# Patient Record
Sex: FEMALE | Race: WHITE | NOT HISPANIC OR LATINO
[De-identification: names, ages, dates, MRNs, and addresses within clinical notes are randomized per-mention and may not be internally consistent; named-entity substitution may affect disease eponyms.]

---

## 2017-10-26 ENCOUNTER — CHARTING TRANS (OUTPATIENT)
Dept: OTHER | Age: 62
End: 2017-10-26

## 2018-01-16 ENCOUNTER — CHARTING TRANS (OUTPATIENT)
Dept: OTHER | Age: 63
End: 2018-01-16

## 2018-01-29 ENCOUNTER — CHARTING TRANS (OUTPATIENT)
Dept: OTHER | Age: 63
End: 2018-01-29

## 2018-04-24 ENCOUNTER — LAB SERVICES (OUTPATIENT)
Dept: OTHER | Age: 63
End: 2018-04-24

## 2018-05-01 ENCOUNTER — CHARTING TRANS (OUTPATIENT)
Dept: OTHER | Age: 63
End: 2018-05-01

## 2018-05-01 LAB
CALCIUM IONIZED: 1.31 MMOL/L (ref 1.15–1.29)
CALCIUM URINE: 5.4 MG/DL
CREATININE 24 HOUR URINE: 1176 MG/24 (ref 800–1800)
CREATININE URINE: 44.8 MG/DL
CREATININE: 0.85 MG/DL (ref 0.52–1.04)
EGFR FOR AFRICAN AMERICANS: > 60
EGFR FOR NON-AFRICAN AMERICANS: > 60
IONIZED CALCIUM CORRECTION: 1.27 MMOL/L (ref 1.15–1.29)
T3 FREE: 2.4 PG/ML (ref 2.2–4)
THYROGLOBULIN ANTIBODY: <0.9 IU/ML (ref 0–4)
THYROXINE, FREE: 0.87 NG/DL (ref 0.78–2.19)
TOTAL T3: 0.92 NG/ML (ref 0.6–1.81)
U-CALCIUM: 142 MG/24 HR (ref 50–300)
URINE VOLUME: 2625 ML

## 2018-11-02 VITALS
BODY MASS INDEX: 39.38 KG/M2 | WEIGHT: 222.22 LBS | SYSTOLIC BLOOD PRESSURE: 126 MMHG | DIASTOLIC BLOOD PRESSURE: 68 MMHG | HEART RATE: 53 BPM | HEIGHT: 63 IN | OXYGEN SATURATION: 96 %

## 2018-11-02 VITALS
SYSTOLIC BLOOD PRESSURE: 151 MMHG | HEART RATE: 49 BPM | WEIGHT: 223 LBS | DIASTOLIC BLOOD PRESSURE: 83 MMHG | BODY MASS INDEX: 39.51 KG/M2 | HEIGHT: 63 IN

## 2019-02-06 ENCOUNTER — TELEPHONE (OUTPATIENT)
Dept: ENDOCRINOLOGY | Age: 64
End: 2019-02-06

## 2019-02-06 DIAGNOSIS — E04.2 MULTINODULAR NON-TOXIC GOITER: Primary | ICD-10-CM

## 2019-02-06 DIAGNOSIS — E83.52 HYPERCALCEMIA: ICD-10-CM

## 2019-02-06 DIAGNOSIS — E04.2 NONTOXIC MULTINODULAR GOITER: ICD-10-CM

## 2019-02-06 DIAGNOSIS — E55.9 VITAMIN D DEFICIENCY: ICD-10-CM

## 2019-03-28 ENCOUNTER — OFFICE VISIT (OUTPATIENT)
Dept: ENDOCRINOLOGY | Age: 64
End: 2019-03-28

## 2019-03-28 VITALS
BODY MASS INDEX: 40.04 KG/M2 | HEART RATE: 57 BPM | HEIGHT: 63 IN | SYSTOLIC BLOOD PRESSURE: 143 MMHG | WEIGHT: 226 LBS | DIASTOLIC BLOOD PRESSURE: 89 MMHG

## 2019-03-28 DIAGNOSIS — E55.9 VITAMIN D DEFICIENCY: ICD-10-CM

## 2019-03-28 DIAGNOSIS — E21.3 HYPERPARATHYROIDISM (CMD): ICD-10-CM

## 2019-03-28 DIAGNOSIS — E83.52 HYPERCALCEMIA: Primary | ICD-10-CM

## 2019-03-28 DIAGNOSIS — E04.2 NONTOXIC MULTINODULAR GOITER: ICD-10-CM

## 2019-03-28 PROBLEM — E66.01 MORBID OBESITY (CMD): Status: ACTIVE | Noted: 2019-03-28

## 2019-03-28 PROCEDURE — 99214 OFFICE O/P EST MOD 30 MIN: CPT | Performed by: INTERNAL MEDICINE

## 2019-03-28 RX ORDER — IBUPROFEN 200 MG
200 TABLET ORAL PRN
COMMUNITY

## 2019-03-28 RX ORDER — ESCITALOPRAM OXALATE 10 MG/1
20 TABLET ORAL DAILY
COMMUNITY

## 2019-03-28 RX ORDER — AMOXICILLIN 500 MG
CAPSULE ORAL DAILY
COMMUNITY

## 2019-03-28 RX ORDER — MULTIVIT,IRON,MINERALS/LUTEIN
TABLET ORAL DAILY
COMMUNITY

## 2019-03-28 RX ORDER — MELOXICAM 15 MG/1
TABLET ORAL DAILY
COMMUNITY
End: 2019-11-18 | Stop reason: ALTCHOICE

## 2019-04-03 DIAGNOSIS — E55.9 VITAMIN D DEFICIENCY: ICD-10-CM

## 2019-04-03 DIAGNOSIS — E04.2 NONTOXIC MULTINODULAR GOITER: ICD-10-CM

## 2019-04-03 DIAGNOSIS — E04.2 MULTINODULAR NON-TOXIC GOITER: ICD-10-CM

## 2019-04-03 DIAGNOSIS — E83.52 HYPERCALCEMIA: ICD-10-CM

## 2019-05-14 LAB
ALBUMIN: 4.2 GM/DL (ref 3.5–5)
ALKALINE PHOSPHATASE: 46 U/L (ref 38–126)
ALT: 26 U/L (ref 4–34)
ANTICOAGULANT COMMENT: NORMAL
APPEARANCE, URINE: CLEAR
AST: 28 U/L (ref 14–40)
BACTERIA, URINE: ABNORMAL
BILIRUBIN TOTAL: 0.4 MG/DL (ref 0.2–1.3)
BILIRUBIN-URINE: ABNORMAL
BUN SERPL-MCNC: 19 MG/DL (ref 7–17)
CALCIUM: 10.4 MG/DL (ref 8.4–10.2)
CARBON DIOXIDE: 30 MMOL/L (ref 22–30)
CHLORIDE: 103 MMOL/L (ref 98–107)
COLOR, URINE: YELLOW
CREATININE: 0.71 MG/DL (ref 0.52–1.04)
EGFR FOR AFRICAN AMERICANS: >60
EGFR FOR NON-AFRICAN AMERICANS: >60
GLUCOSE URINE-UA: ABNORMAL
GLUCOSE: 101 MG/DL (ref 70–99)
HEMATOCRIT: 42.5 % (ref 37–47)
HEMOGLOBIN: 14.7 GM/DL (ref 12–16)
INR: 1 (ref 0.9–1.1)
KETONE-URINE: ABNORMAL
MEAN CORPUSCULAR HEMOGLOBIN: 30.6 PG/CELL (ref 27–35)
MEAN CORPUSCULAR HGB CONC: 34.6 G/DL (ref 32–36)
MEAN CORPUSCULAR VOLUME: 88.4 FL (ref 81–102)
MEAN PLATELET VOLUME: 7.3 FL (ref 6.7–10.4)
MUCUS, URINE: ABNORMAL /LPF
NITRITE-URINE: ABNORMAL
NO CASTS, URINE: ABNORMAL
NO CRYSTALS, URINE: ABNORMAL
OCCULT BLOOD URINE: ABNORMAL
PH-URINE: 5.5 (ref 5–8)
PLATELET COUNT: 261 K/UL (ref 145–375)
POTASSIUM SERPL-SCNC: 4.3 MMOL/L (ref 3.5–5)
PROTEIN-URINE-DIP: ABNORMAL
PROTHROMBIN TIME (PATIENT): 10.8 SEC (ref 9.7–11.8)
RBC-URINE: ABNORMAL /HPF (ref 0–2)
RED CELL COUNT: 4.81 M/UL (ref 3.8–5.1)
RED CELL DISTRIBUTION WIDTH: 12.3 % (ref 11.5–14.5)
SODIUM: 140 MMOL/L (ref 136–145)
SPECIFIC GRAVITY-URINE: 1.01 (ref 1.01–1.03)
SQUAMOUS EPITHELIAL CELL URINE: ABNORMAL /LPF (ref 0–2)
TOTAL PROTEIN: 7.3 GM/DL (ref 6.3–8.3)
URINE LEUKOCYTE ESTERASE: ABNORMAL
UROBILINOGEN-URINE: ABNORMAL MG/DL (ref 0.2–1)
WBC-URINE: ABNORMAL /HPF (ref 0–2)
WHITE BLOOD COUNT: 7.5 K/UL (ref 4.8–10.8)

## 2019-06-05 PROCEDURE — 99252 IP/OBS CONSLTJ NEW/EST SF 35: CPT | Performed by: INTERNAL MEDICINE

## 2019-06-06 PROCEDURE — 99232 SBSQ HOSP IP/OBS MODERATE 35: CPT | Performed by: INTERNAL MEDICINE

## 2019-09-11 LAB
ALBUMIN: 4.3 GM/DL (ref 3.5–5)
ALKALINE PHOSPHATASE: 59 U/L (ref 38–126)
ALT: 25 U/L (ref 4–34)
ANTICOAGULANT COMMENT: NORMAL
APPEARANCE, URINE: CLEAR
AST: 28 U/L (ref 14–40)
BILIRUBIN TOTAL: 0.4 MG/DL (ref 0.2–1.3)
BILIRUBIN-URINE: NORMAL
BUN SERPL-MCNC: 19 MG/DL (ref 7–17)
CALCIUM: 10.2 MG/DL (ref 8.4–10.2)
CARBON DIOXIDE: 29 MMOL/L (ref 22–30)
CHLORIDE: 103 MMOL/L (ref 98–107)
COLOR, URINE: YELLOW
CREATININE: 0.75 MG/DL (ref 0.52–1.04)
EGFR FOR AFRICAN AMERICANS: >60
EGFR FOR NON-AFRICAN AMERICANS: >60
GLUCOSE URINE-UA: NORMAL
GLUCOSE: 113 MG/DL (ref 70–99)
HEMATOCRIT: 43.3 % (ref 37–47)
HEMOGLOBIN: 14.8 GM/DL (ref 12–16)
INR: 1 (ref 0.9–1.1)
KETONE-URINE: NORMAL
MEAN CORPUSCULAR HEMOGLOBIN: 30.3 PG/CELL (ref 27–35)
MEAN CORPUSCULAR HGB CONC: 34.2 G/DL (ref 32–36)
MEAN CORPUSCULAR VOLUME: 88.5 FL (ref 81–102)
MEAN PLATELET VOLUME: 7.5 FL (ref 6.7–10.4)
NITRITE-URINE: NORMAL
OCCULT BLOOD URINE: NORMAL
PH-URINE: 6 (ref 5–8)
PLATELET COUNT: 278 K/UL (ref 145–375)
POTASSIUM SERPL-SCNC: 4.1 MMOL/L (ref 3.5–5)
PROTEIN-URINE-DIP: NORMAL
PROTHROMBIN TIME (PATIENT): 10.7 SEC (ref 9.7–11.8)
RED CELL COUNT: 4.88 M/UL (ref 3.8–5.1)
RED CELL DISTRIBUTION WIDTH: 12.5 % (ref 11.5–14.5)
SODIUM: 139 MMOL/L (ref 136–145)
SPECIFIC GRAVITY-URINE: 1.01 (ref 1.01–1.03)
TOTAL PROTEIN: 7.6 GM/DL (ref 6.3–8.3)
URINE LEUKOCYTE ESTERASE: NORMAL
UROBILINOGEN-URINE: NORMAL MG/DL (ref 0.2–1)
WHITE BLOOD COUNT: 10.9 K/UL (ref 4.8–10.8)

## 2019-09-13 LAB
SPECIFIC ORGANISM CULT: NORMAL
SPECIFIC ORGANISM CULT: NORMAL

## 2019-09-14 LAB
SPECIFIC ORGANISM CULT: NORMAL
SPECIFIC ORGANISM CULT: NORMAL

## 2019-10-02 ENCOUNTER — APPOINTMENT (OUTPATIENT)
Dept: ENDOCRINOLOGY | Age: 64
End: 2019-10-02

## 2019-10-02 PROCEDURE — 99223 1ST HOSP IP/OBS HIGH 75: CPT | Performed by: INTERNAL MEDICINE

## 2019-10-03 PROCEDURE — 99239 HOSP IP/OBS DSCHRG MGMT >30: CPT | Performed by: INTERNAL MEDICINE

## 2019-11-05 LAB
25 OH VITAMIN D TOTAL: 51.3 NG/ML (ref 30–100)
ALBUMIN: 4.4 GM/DL (ref 3.5–5)
BUN SERPL-MCNC: 18 MG/DL (ref 7–17)
CALCIUM: 10.7 MG/DL (ref 8.4–10.2)
CARBON DIOXIDE: 31 MMOL/L (ref 22–30)
CHLORIDE: 102 MMOL/L (ref 98–107)
CREATININE: 0.73 MG/DL (ref 0.52–1.04)
EGFR FOR AFRICAN AMERICANS: >60
EGFR FOR NON-AFRICAN AMERICANS: >60
GLUCOSE: 101 MG/DL (ref 70–99)
PHOSPHORUS: 3.8 MG/DL (ref 2.5–4.5)
POTASSIUM SERPL-SCNC: 4.6 MMOL/L (ref 3.5–5)
PTH-PARATHYROID HORMONE INTACT: 56.4 PG/ML (ref 14–72.2)
SODIUM: 137 MMOL/L (ref 136–145)
THYROID STIMULATING HORMONE: 0.97 MIU/ML (ref 0.47–4.68)
THYROID STIMULATING HORMONE: 0.97 MIU/ML (ref 0.47–4.68)
THYROXINE, FREE: 0.81 NG/DL (ref 0.78–2.19)

## 2019-11-06 LAB
CALCIUM, IONIZED: 1.4 MMOL/L (ref 1.15–1.29)
IONIZED CALCIUM CORRECTION: 1.36 MMOL/L (ref 1.15–1.29)

## 2019-11-18 ENCOUNTER — OFFICE VISIT (OUTPATIENT)
Dept: ENDOCRINOLOGY | Age: 64
End: 2019-11-18

## 2019-11-18 VITALS
SYSTOLIC BLOOD PRESSURE: 153 MMHG | HEIGHT: 63 IN | HEART RATE: 63 BPM | BODY MASS INDEX: 40.17 KG/M2 | DIASTOLIC BLOOD PRESSURE: 83 MMHG | WEIGHT: 226.7 LBS

## 2019-11-18 DIAGNOSIS — E66.01 MORBID OBESITY (CMD): ICD-10-CM

## 2019-11-18 DIAGNOSIS — E83.52 HYPERCALCEMIA: Primary | ICD-10-CM

## 2019-11-18 DIAGNOSIS — E04.2 NONTOXIC MULTINODULAR GOITER: ICD-10-CM

## 2019-11-18 DIAGNOSIS — E55.9 VITAMIN D DEFICIENCY: ICD-10-CM

## 2019-11-18 DIAGNOSIS — E21.3 HYPERPARATHYROIDISM (CMD): ICD-10-CM

## 2019-11-18 PROCEDURE — 99214 OFFICE O/P EST MOD 30 MIN: CPT | Performed by: INTERNAL MEDICINE

## 2019-11-18 RX ORDER — LISINOPRIL 20 MG/1
20 TABLET ORAL DAILY
COMMUNITY

## 2019-11-18 ASSESSMENT — PATIENT HEALTH QUESTIONNAIRE - PHQ9
1. LITTLE INTEREST OR PLEASURE IN DOING THINGS: NOT AT ALL
SUM OF ALL RESPONSES TO PHQ9 QUESTIONS 1 AND 2: 0
SUM OF ALL RESPONSES TO PHQ9 QUESTIONS 1 AND 2: 0
2. FEELING DOWN, DEPRESSED OR HOPELESS: NOT AT ALL

## 2019-12-14 PROCEDURE — 93010 ELECTROCARDIOGRAM REPORT: CPT | Performed by: INTERNAL MEDICINE

## 2019-12-14 PROCEDURE — 99254 IP/OBS CNSLTJ NEW/EST MOD 60: CPT | Performed by: COLON & RECTAL SURGERY

## 2019-12-14 PROCEDURE — 99285 EMERGENCY DEPT VISIT HI MDM: CPT | Performed by: NURSE PRACTITIONER

## 2019-12-14 PROCEDURE — 99222 1ST HOSP IP/OBS MODERATE 55: CPT | Performed by: INTERNAL MEDICINE

## 2019-12-15 PROCEDURE — 47563 LAPARO CHOLECYSTECTOMY/GRAPH: CPT | Performed by: COLON & RECTAL SURGERY

## 2019-12-15 PROCEDURE — 99233 SBSQ HOSP IP/OBS HIGH 50: CPT | Performed by: FAMILY MEDICINE

## 2019-12-16 PROCEDURE — 99024 POSTOP FOLLOW-UP VISIT: CPT | Performed by: COLON & RECTAL SURGERY

## 2019-12-16 PROCEDURE — 99233 SBSQ HOSP IP/OBS HIGH 50: CPT | Performed by: FAMILY MEDICINE

## 2019-12-17 PROCEDURE — 99233 SBSQ HOSP IP/OBS HIGH 50: CPT | Performed by: FAMILY MEDICINE

## 2019-12-17 PROCEDURE — 99024 POSTOP FOLLOW-UP VISIT: CPT | Performed by: COLON & RECTAL SURGERY

## 2019-12-18 PROCEDURE — 99024 POSTOP FOLLOW-UP VISIT: CPT | Performed by: COLON & RECTAL SURGERY

## 2019-12-18 PROCEDURE — 99239 HOSP IP/OBS DSCHRG MGMT >30: CPT | Performed by: FAMILY MEDICINE

## 2019-12-30 ENCOUNTER — OFFICE VISIT (OUTPATIENT)
Dept: SURGERY | Age: 64
End: 2019-12-30

## 2019-12-30 VITALS
DIASTOLIC BLOOD PRESSURE: 76 MMHG | WEIGHT: 226 LBS | HEIGHT: 63 IN | SYSTOLIC BLOOD PRESSURE: 126 MMHG | BODY MASS INDEX: 40.04 KG/M2

## 2019-12-30 DIAGNOSIS — K81.0 CHOLECYSTITIS, ACUTE: Primary | ICD-10-CM

## 2019-12-30 PROCEDURE — 99024 POSTOP FOLLOW-UP VISIT: CPT | Performed by: COLON & RECTAL SURGERY

## 2020-05-15 LAB
25 OH VITAMIN D TOTAL: 60.8 NG/ML (ref 30–100)
ALBUMIN: 4.2 GM/DL (ref 3.5–5)
BUN SERPL-MCNC: 17 MG/DL (ref 7–17)
CALCIUM: 10.5 MG/DL (ref 8.4–10.2)
CARBON DIOXIDE: 29 MMOL/L (ref 22–30)
CHLORIDE: 101 MMOL/L (ref 98–107)
CREATININE: 0.75 MG/DL (ref 0.52–1.04)
EGFR FOR AFRICAN AMERICANS: >60
EGFR FOR NON-AFRICAN AMERICANS: >60
GLUCOSE: 103 MG/DL (ref 70–99)
PHOSPHORUS: 3.4 MG/DL (ref 2.5–4.5)
POTASSIUM SERPL-SCNC: 4.3 MMOL/L (ref 3.5–5)
PTH-PARATHYROID HORMONE INTACT: 52.5 PG/ML (ref 14–72.2)
SODIUM: 137 MMOL/L (ref 136–145)
THYROID STIMULATING HORMONE: 1.55 MIU/ML (ref 0.47–4.68)
THYROID STIMULATING HORMONE: 1.55 MIU/ML (ref 0.47–4.68)
THYROXINE, FREE: 0.81 NG/DL (ref 0.78–2.19)

## 2020-05-16 LAB
CALCIUM, IONIZED: 1.39 MMOL/L (ref 1.15–1.29)
IONIZED CALCIUM CORRECTION: 1.35 MMOL/L (ref 1.15–1.29)

## 2020-05-18 ENCOUNTER — OFFICE VISIT (OUTPATIENT)
Dept: ENDOCRINOLOGY | Age: 65
End: 2020-05-18

## 2020-05-18 VITALS — HEIGHT: 63 IN | BODY MASS INDEX: 39.16 KG/M2 | WEIGHT: 221 LBS

## 2020-05-18 DIAGNOSIS — E04.2 NONTOXIC MULTINODULAR GOITER: Primary | ICD-10-CM

## 2020-05-18 DIAGNOSIS — E21.3 HYPERPARATHYROIDISM (CMD): ICD-10-CM

## 2020-05-18 DIAGNOSIS — E55.9 VITAMIN D DEFICIENCY: ICD-10-CM

## 2020-05-18 DIAGNOSIS — E83.52 HYPERCALCEMIA: ICD-10-CM

## 2020-05-18 PROBLEM — E66.9 OBESITY (BMI 30-39.9): Status: ACTIVE | Noted: 2019-03-28

## 2020-05-18 PROCEDURE — 99442 TELEPHONE E&M BY PHYSICIAN EST PT NOT ORIG PREV 7 DAYS 11-20 MIN: CPT | Performed by: INTERNAL MEDICINE

## 2020-05-18 RX ORDER — ASPIRIN 81 MG/1
1 TABLET, CHEWABLE ORAL DAILY
COMMUNITY

## 2020-05-18 RX ORDER — TRIAMCINOLONE ACETONIDE 1 MG/G
1 CREAM TOPICAL 2 TIMES DAILY PRN
COMMUNITY
Start: 2020-02-20

## 2021-04-08 ENCOUNTER — APPOINTMENT (RX ONLY)
Dept: URBAN - METROPOLITAN AREA CLINIC 66 | Facility: CLINIC | Age: 66
Setting detail: DERMATOLOGY
End: 2021-04-08

## 2021-04-08 DIAGNOSIS — L21.8 OTHER SEBORRHEIC DERMATITIS: ICD-10-CM | Status: INADEQUATELY CONTROLLED

## 2021-04-08 DIAGNOSIS — L60.3 NAIL DYSTROPHY: ICD-10-CM

## 2021-04-08 PROBLEM — L30.9 DERMATITIS, UNSPECIFIED: Status: ACTIVE | Noted: 2021-04-08

## 2021-04-08 PROBLEM — L60.9 NAIL DISORDER, UNSPECIFIED: Status: ACTIVE | Noted: 2021-04-08

## 2021-04-08 PROCEDURE — 99204 OFFICE O/P NEW MOD 45 MIN: CPT

## 2021-04-08 PROCEDURE — ? ORDER TESTS

## 2021-04-08 PROCEDURE — ? COUNSELING

## 2021-04-08 PROCEDURE — ? PRESCRIPTION

## 2021-04-08 PROCEDURE — ? OTHER

## 2021-04-08 RX ORDER — KETOCONAZOLE 20 MG/G
CREAM TOPICAL BID
Qty: 1 | Refills: 5 | Status: ERX | COMMUNITY
Start: 2021-04-08

## 2021-04-08 RX ORDER — DESONIDE 0.5 MG/G
CREAM TOPICAL
Qty: 1 | Refills: 0 | Status: ERX | COMMUNITY
Start: 2021-04-08

## 2021-04-08 RX ADMIN — KETOCONAZOLE: 20 CREAM TOPICAL at 00:00

## 2021-04-08 RX ADMIN — DESONIDE: 0.5 CREAM TOPICAL at 00:00

## 2021-04-08 ASSESSMENT — LOCATION SIMPLE DESCRIPTION DERM
LOCATION SIMPLE: LEFT THUMBNAIL
LOCATION SIMPLE: LEFT EYEBROW
LOCATION SIMPLE: RIGHT THUMBNAIL
LOCATION SIMPLE: RIGHT CHEEK
LOCATION SIMPLE: RIGHT EYEBROW
LOCATION SIMPLE: LEFT CHEEK

## 2021-04-08 ASSESSMENT — LOCATION ZONE DERM
LOCATION ZONE: FACE
LOCATION ZONE: FINGERNAIL

## 2021-04-08 ASSESSMENT — LOCATION DETAILED DESCRIPTION DERM
LOCATION DETAILED: RIGHT CENTRAL EYEBROW
LOCATION DETAILED: RIGHT SUPERIOR CENTRAL MALAR CHEEK
LOCATION DETAILED: LEFT SUPERIOR CENTRAL MALAR CHEEK
LOCATION DETAILED: RIGHT THUMBNAIL
LOCATION DETAILED: LEFT CENTRAL EYEBROW
LOCATION DETAILED: LEFT THUMBNAIL

## 2021-04-08 NOTE — PROCEDURE: OTHER
Render Risk Assessment In Note?: yes
Note Text (......Xxx Chief Complaint.): This diagnosis correlates with the
Other (Free Text): Patient will take vitamin E
Detail Level: Zone
Other (Free Text): EDWAR ordered for this diagnosis

## 2021-04-08 NOTE — PROCEDURE: MIPS QUALITY
Quality 110: Preventive Care And Screening: Influenza Immunization: Influenza Immunization not Administered for Documented Reasons.
Quality 111:Pneumonia Vaccination Status For Older Adults: Pneumococcal Vaccination not Administered or Previously Received, Reason not Otherwise Specified
Additional Notes: Patient doesn’t get the flu vaccine
Detail Level: Detailed

## 2021-04-08 NOTE — PROCEDURE: ORDER TESTS
Bill For Surgical Tray: no
Performing Laboratory: [id number]
Lab Facility: [id number]
Expected Date Of Service: 04/08/2021
Billing Type: Third-Party Bill